# Patient Record
Sex: MALE | Race: WHITE | NOT HISPANIC OR LATINO | URBAN - NONMETROPOLITAN AREA
[De-identification: names, ages, dates, MRNs, and addresses within clinical notes are randomized per-mention and may not be internally consistent; named-entity substitution may affect disease eponyms.]

---

## 2018-08-28 PROBLEM — H52.03: Noted: 2018-08-28

## 2018-08-28 PROBLEM — H52.4: Noted: 2018-08-28

## 2018-08-28 PROBLEM — H52.223: Noted: 2018-08-28

## 2018-08-28 PROBLEM — H25.13: Noted: 2018-08-28

## 2019-09-03 ENCOUNTER — IMPORTED ENCOUNTER (OUTPATIENT)
Dept: URBAN - NONMETROPOLITAN AREA CLINIC 1 | Facility: CLINIC | Age: 69
End: 2019-09-03

## 2019-09-03 PROCEDURE — 92014 COMPRE OPH EXAM EST PT 1/>: CPT

## 2019-09-03 PROCEDURE — 92015 DETERMINE REFRACTIVE STATE: CPT

## 2019-09-03 PROCEDURE — 92310 CONTACT LENS FITTING OU: CPT

## 2019-09-03 NOTE — PATIENT DISCUSSION
Hyperopia/Presbyopia OU-  discussed findings w/patient-  new spectacle/CL Rx issued-  Continue to monitor yearly or prnNS OU - Discussed diagnosis in detail with patient- Discussed signs and symptoms associated w/ progression - Recommend UV protection - Not visually significant at this time - Continue to monitor; 's Notes: MR 9/3/2019<br />DFE deferred<br />

## 2020-11-09 NOTE — PATIENT DISCUSSION
GLAUCOMA:  I have talked with the patient about my impressions, explained our treatment plan, and have answered all questions and patient understands. Continue present eye drops. Recommend following yearly.

## 2021-04-07 ENCOUNTER — PREPPED CHART (OUTPATIENT)
Dept: URBAN - NONMETROPOLITAN AREA CLINIC 4 | Facility: CLINIC | Age: 71
End: 2021-04-07

## 2021-04-07 ENCOUNTER — IMPORTED ENCOUNTER (OUTPATIENT)
Dept: URBAN - NONMETROPOLITAN AREA CLINIC 1 | Facility: CLINIC | Age: 71
End: 2021-04-07

## 2021-04-07 PROCEDURE — 92310 CONTACT LENS FITTING OU: CPT

## 2021-04-07 PROCEDURE — 92014 COMPRE OPH EXAM EST PT 1/>: CPT

## 2021-04-07 PROCEDURE — 92015 DETERMINE REFRACTIVE STATE: CPT

## 2021-04-07 NOTE — PATIENT DISCUSSION
Compound Hyperopic Astigmatism OU w/Presbyopia-  discussed findings w/patient-  new spectacle/CL Rx issued-  advised patient to d/c extended wear of CL's-  continue to monitor yearly or prnCataracts OU- Discussed diagnosis in detail with patient- Discussed signs and symptoms associated w/ progression - Recommend UV protection - Not visually significant at this time - Continue to monitor; 's Notes:  4/7/2021D 4/7/2021

## 2021-11-02 NOTE — PATIENT DISCUSSION
Concerned about slow progression. Will switch to Lisbeth Sermon and stopping beta blocker due to imune reaction. WIll test OCT ganglion cell and RNFL at return.

## 2022-04-06 ASSESSMENT — VISUAL ACUITY
OS_CC: 20/40
OD_CC: 20/30+2

## 2022-04-06 ASSESSMENT — TONOMETRY
OD_IOP_MMHG: 18
OS_IOP_MMHG: 18

## 2022-04-09 ASSESSMENT — VISUAL ACUITY
OD_SC: 20/30+2
OS_SC: 20/40
OD_SC: 20/30+1
OS_SC: 20/100
OS_SC: 20/25+2
OS_SC: 20/100
OD_SC: 20/30+1

## 2022-04-09 ASSESSMENT — TONOMETRY
OD_IOP_MMHG: 18
OD_IOP_MMHG: 17
OS_IOP_MMHG: 18
OS_IOP_MMHG: 19

## 2022-04-11 ENCOUNTER — COMPREHENSIVE EXAM (OUTPATIENT)
Dept: URBAN - NONMETROPOLITAN AREA CLINIC 4 | Facility: CLINIC | Age: 72
End: 2022-04-11

## 2022-04-11 DIAGNOSIS — H52.4: ICD-10-CM

## 2022-04-11 DIAGNOSIS — H52.03: ICD-10-CM

## 2022-04-11 DIAGNOSIS — H52.223: ICD-10-CM

## 2022-04-11 PROCEDURE — 92015 DETERMINE REFRACTIVE STATE: CPT

## 2022-04-11 PROCEDURE — 92014 COMPRE OPH EXAM EST PT 1/>: CPT

## 2022-04-11 PROCEDURE — 92310 CONTACT LENS FITTING OU: CPT

## 2022-04-11 ASSESSMENT — VISUAL ACUITY
OD_CC: 20/30
OU_CC: 20/20
OS_CC: 20/20
OU_CC: 20/30

## 2022-04-11 ASSESSMENT — TONOMETRY
OD_IOP_MMHG: 20
OS_IOP_MMHG: 19

## 2022-06-21 PROBLEM — I21.4 NSTEMI (NON-ST ELEVATED MYOCARDIAL INFARCTION) (HCC): Status: ACTIVE | Noted: 2022-06-21

## 2022-06-28 NOTE — PATIENT DISCUSSION
Concerned about slow progression. Will switch to Ron Fearing and stopping beta blocker due to imune reaction. WIll test OCT ganglion cell and RNFL at return.

## 2022-06-28 NOTE — PATIENT DISCUSSION
She would like to be able to drop free due to irritation with drops. Cataracts are not significant enough to have surgery she is reluctant to have glaucoma implant. However she would like to consider bilateral Durysta injections. Will discuss this with her further with Literature.

## 2023-02-01 RX ORDER — ATORVASTATIN CALCIUM 80 MG/1
80 TABLET, FILM COATED ORAL
COMMUNITY
Start: 2022-06-23

## 2023-02-01 RX ORDER — ASPIRIN 81 MG/1
81 TABLET ORAL DAILY
COMMUNITY
Start: 2022-06-23

## 2023-02-01 RX ORDER — METOPROLOL SUCCINATE 25 MG/1
25 TABLET, EXTENDED RELEASE ORAL DAILY
COMMUNITY
Start: 2022-06-23

## 2023-02-01 RX ORDER — LISINOPRIL 5 MG/1
5 TABLET ORAL DAILY
COMMUNITY
Start: 2022-06-24

## 2023-05-10 ENCOUNTER — ESTABLISHED PATIENT (OUTPATIENT)
Dept: RURAL CLINIC 2 | Facility: CLINIC | Age: 73
End: 2023-05-10

## 2023-05-10 DIAGNOSIS — H52.223: ICD-10-CM

## 2023-05-10 DIAGNOSIS — H52.4: ICD-10-CM

## 2023-05-10 DIAGNOSIS — H25.813: ICD-10-CM

## 2023-05-10 DIAGNOSIS — H43.813: ICD-10-CM

## 2023-05-10 DIAGNOSIS — H52.03: ICD-10-CM

## 2023-05-10 PROCEDURE — 92015 DETERMINE REFRACTIVE STATE: CPT

## 2023-05-10 PROCEDURE — 92310-E CONTACT LENS FITTING ESTABLISH PATIENT

## 2023-05-10 PROCEDURE — 92134 CPTRZ OPH DX IMG PST SGM RTA: CPT

## 2023-05-10 PROCEDURE — 92014 COMPRE OPH EXAM EST PT 1/>: CPT

## 2023-05-10 ASSESSMENT — TONOMETRY
OS_IOP_MMHG: 13
OD_IOP_MMHG: 16

## 2023-05-10 ASSESSMENT — VISUAL ACUITY
OD_CC: 20/20
OS_CC: 20/30

## 2024-05-13 ENCOUNTER — ESTABLISHED PATIENT (OUTPATIENT)
Dept: RURAL CLINIC 2 | Facility: CLINIC | Age: 74
End: 2024-05-13

## 2024-05-13 DIAGNOSIS — H52.03: ICD-10-CM

## 2024-05-13 DIAGNOSIS — H52.4: ICD-10-CM

## 2024-05-13 DIAGNOSIS — H43.813: ICD-10-CM

## 2024-05-13 DIAGNOSIS — H25.813: ICD-10-CM

## 2024-05-13 PROCEDURE — 92310-E CONTACT LENS FITTING ESTABLISH PATIENT

## 2024-05-13 PROCEDURE — 92015 DETERMINE REFRACTIVE STATE: CPT

## 2024-05-13 PROCEDURE — 92134 CPTRZ OPH DX IMG PST SGM RTA: CPT

## 2024-05-13 PROCEDURE — 92014 COMPRE OPH EXAM EST PT 1/>: CPT

## 2024-05-13 ASSESSMENT — TONOMETRY
OS_IOP_MMHG: 15
OD_IOP_MMHG: 17

## 2024-05-13 ASSESSMENT — VISUAL ACUITY
OS_CC: 20/20
OD_CC: 20/30-1

## 2025-05-14 ENCOUNTER — COMPREHENSIVE EXAM (OUTPATIENT)
Age: 75
End: 2025-05-14

## 2025-05-14 DIAGNOSIS — H25.813: ICD-10-CM

## 2025-05-14 DIAGNOSIS — H52.223: ICD-10-CM

## 2025-05-14 DIAGNOSIS — H43.813: ICD-10-CM

## 2025-05-14 DIAGNOSIS — H52.4: ICD-10-CM

## 2025-05-14 DIAGNOSIS — H52.03: ICD-10-CM

## 2025-05-14 PROCEDURE — 92310-1 LEVEL 1 SOFT LENS UPDATE

## 2025-05-14 PROCEDURE — 92012 INTRM OPH EXAM EST PATIENT: CPT

## 2025-05-14 PROCEDURE — 92015 DETERMINE REFRACTIVE STATE: CPT
